# Patient Record
Sex: FEMALE | Race: WHITE
[De-identification: names, ages, dates, MRNs, and addresses within clinical notes are randomized per-mention and may not be internally consistent; named-entity substitution may affect disease eponyms.]

---

## 2018-09-27 ENCOUNTER — HOSPITAL ENCOUNTER (EMERGENCY)
Dept: HOSPITAL 61 - ER | Age: 38
Discharge: HOME | End: 2018-09-27
Payer: COMMERCIAL

## 2018-09-27 VITALS — BODY MASS INDEX: 20.14 KG/M2 | HEIGHT: 64 IN | WEIGHT: 118 LBS

## 2018-09-27 VITALS — SYSTOLIC BLOOD PRESSURE: 110 MMHG | DIASTOLIC BLOOD PRESSURE: 60 MMHG

## 2018-09-27 DIAGNOSIS — R55: Primary | ICD-10-CM

## 2018-09-27 DIAGNOSIS — G43.909: ICD-10-CM

## 2018-09-27 DIAGNOSIS — R00.0: ICD-10-CM

## 2018-09-27 DIAGNOSIS — R42: ICD-10-CM

## 2018-09-27 LAB
ALBUMIN SERPL-MCNC: 3.7 G/DL (ref 3.4–5)
ALBUMIN/GLOB SERPL: 0.9 {RATIO} (ref 1–1.7)
ALP SERPL-CCNC: 47 U/L (ref 46–116)
ALT SERPL-CCNC: 19 U/L (ref 14–59)
ANION GAP SERPL CALC-SCNC: 12 MMOL/L (ref 6–14)
APTT PPP: YELLOW S
AST SERPL-CCNC: 15 U/L (ref 15–37)
BACTERIA #/AREA URNS HPF: (no result) /HPF
BASOPHILS # BLD AUTO: 0 X10^3/UL (ref 0–0.2)
BASOPHILS NFR BLD: 1 % (ref 0–3)
BILIRUB SERPL-MCNC: 0.4 MG/DL (ref 0.2–1)
BILIRUB UR QL STRIP: NEGATIVE
BUN SERPL-MCNC: 11 MG/DL (ref 7–20)
BUN/CREAT SERPL: 14 (ref 6–20)
CALCIUM SERPL-MCNC: 9.4 MG/DL (ref 8.5–10.1)
CHLORIDE SERPL-SCNC: 106 MMOL/L (ref 98–107)
CO2 SERPL-SCNC: 25 MMOL/L (ref 21–32)
CREAT SERPL-MCNC: 0.8 MG/DL (ref 0.6–1)
EOSINOPHIL NFR BLD: 0 X10^3/UL (ref 0–0.7)
EOSINOPHIL NFR BLD: 1 % (ref 0–3)
ERYTHROCYTE [DISTWIDTH] IN BLOOD BY AUTOMATED COUNT: 12.4 % (ref 11.5–14.5)
FIBRINOGEN PPP-MCNC: CLEAR MG/DL
GFR SERPLBLD BASED ON 1.73 SQ M-ARVRAT: 81.2 ML/MIN
GLOBULIN SER-MCNC: 4 G/DL (ref 2.2–3.8)
GLUCOSE SERPL-MCNC: 114 MG/DL (ref 70–99)
HCT VFR BLD CALC: 35.2 % (ref 36–47)
HGB BLD-MCNC: 12.3 G/DL (ref 12–15.5)
LIPASE: 318 U/L (ref 73–393)
LYMPHOCYTES # BLD: 1.7 X10^3/UL (ref 1–4.8)
LYMPHOCYTES NFR BLD AUTO: 25 % (ref 24–48)
MCH RBC QN AUTO: 32 PG (ref 25–35)
MCHC RBC AUTO-ENTMCNC: 35 G/DL (ref 31–37)
MCV RBC AUTO: 92 FL (ref 79–100)
MONO #: 0.4 X10^3/UL (ref 0–1.1)
MONOCYTES NFR BLD: 7 % (ref 0–9)
NEUT #: 4.5 X10^3UL (ref 1.8–7.7)
NEUTROPHILS NFR BLD AUTO: 67 % (ref 31–73)
NITRITE UR QL STRIP: NEGATIVE
PH UR STRIP: 5.5 [PH]
PLATELET # BLD AUTO: 269 X10^3/UL (ref 140–400)
POTASSIUM SERPL-SCNC: 3.6 MMOL/L (ref 3.5–5.1)
PROT SERPL-MCNC: 7.7 G/DL (ref 6.4–8.2)
PROT UR STRIP-MCNC: NEGATIVE MG/DL
RBC # BLD AUTO: 3.83 X10^6/UL (ref 3.5–5.4)
RBC #/AREA URNS HPF: (no result) /HPF (ref 0–2)
SODIUM SERPL-SCNC: 143 MMOL/L (ref 136–145)
SQUAMOUS #/AREA URNS LPF: (no result) /LPF
T4 FREE SERPL-MCNC: 1.08 NG/DL (ref 0.76–1.46)
THYROID STIM HORMONE (TSH): 1.95 UIU/ML (ref 0.36–3.74)
UROBILINOGEN UR-MCNC: 0.2 MG/DL
WBC # BLD AUTO: 6.8 X10^3/UL (ref 4–11)
WBC #/AREA URNS HPF: (no result) /HPF (ref 0–4)

## 2018-09-27 PROCEDURE — 87086 URINE CULTURE/COLONY COUNT: CPT

## 2018-09-27 PROCEDURE — 93970 EXTREMITY STUDY: CPT

## 2018-09-27 PROCEDURE — 81025 URINE PREGNANCY TEST: CPT

## 2018-09-27 PROCEDURE — 81001 URINALYSIS AUTO W/SCOPE: CPT

## 2018-09-27 PROCEDURE — 82962 GLUCOSE BLOOD TEST: CPT

## 2018-09-27 PROCEDURE — 99285 EMERGENCY DEPT VISIT HI MDM: CPT

## 2018-09-27 PROCEDURE — 84443 ASSAY THYROID STIM HORMONE: CPT

## 2018-09-27 PROCEDURE — 84439 ASSAY OF FREE THYROXINE: CPT

## 2018-09-27 PROCEDURE — 83690 ASSAY OF LIPASE: CPT

## 2018-09-27 PROCEDURE — 84484 ASSAY OF TROPONIN QUANT: CPT

## 2018-09-27 PROCEDURE — 80053 COMPREHEN METABOLIC PANEL: CPT

## 2018-09-27 PROCEDURE — 93005 ELECTROCARDIOGRAM TRACING: CPT

## 2018-09-27 PROCEDURE — 36415 COLL VENOUS BLD VENIPUNCTURE: CPT

## 2018-09-27 PROCEDURE — 85025 COMPLETE CBC W/AUTO DIFF WBC: CPT

## 2018-09-27 PROCEDURE — 96361 HYDRATE IV INFUSION ADD-ON: CPT

## 2018-09-27 PROCEDURE — 96374 THER/PROPH/DIAG INJ IV PUSH: CPT

## 2018-09-27 NOTE — PHYS DOC
Past Medical History


Past Medical History:  Migraines, Other


Additional Past Medical Histor:  SVT


Past Surgical History:  Other


Additional Past Surgical Histo:  LEEP,EYE


Additional Information:  


VAPES


Alcohol Use:  None


Drug Use:  None





Adult General


Chief Complaint


Chief Complaint:  NEAR SYNCOPE





[facility]


HPI





Patient is a 38  year old female who presents to the ER after near syncopal 

episode. Patient reports history of SVT previously follows with Dr. Brownlee at 

[place] [facility]. Patient reports that she was recommended for 

ablation but managed to symptoms with metoprolol and has since tapered off. 

Currently not on any medications Patient reports that she last had an episode 

of SVT approximately 3 years ago. Patient reports on Thursday she had syncopal 

episode after getting up off of the couch. Patient states that she was 

evaluated at an ER was told that she likely had vasovagal episode. Patient 

reports that she has since had similar episodes on a daily basis. Patient 

reports today she was at her desk at work when she had acute onset of nausea, 

lightheadedness and felt that she was given a pass out. Symptoms are currently 

resolved. She denies any preceding chest pain, shortness of breath, 

palpitations. Patient reports intermittent calf pain to bilateral calves. On 

EMS evaluation patient had heart rate in the 120s but was noted to be normal 

sinus rhythm. Patient admits to feeling anxious at this time but denies any 

increased social stressors.





Review of Systems


Review of Systems





Constitutional: Denies fever or chills []


Eyes: Denies change in visual acuity, redness, or eye pain []


HENT: Denies nasal congestion or sore throat []


Respiratory: Denies cough or shortness of breath []


Cardiovascular: No additional information not addressed in HPI []


GI: Denies abdominal pain, nausea, vomiting, bloody stools or diarrhea []


: Denies dysuria or hematuria []


Musculoskeletal: Denies back pain or joint pain []


Integument: Denies rash or skin lesions []


Neurologic: Denies headache, focal weakness or sensory changes []


Endocrine: Denies polyuria or polydipsia []





All other systems were reviewed and found to be within normal limits, except as 

documented in this note.





Current Medications


Current Medications





Current Medications








 Medications


  (Trade)  Dose


 Ordered  Sig/April  Start Time


 Stop Time Status Last Admin


Dose Admin


 


 Ondansetron HCl


  (Zofran)  8 mg  1X  ONCE  9/27/18 14:30


 9/27/18 14:40 DC 9/27/18 14:46


8 MG


 


 Sodium Chloride  1,000 ml @ 


 1,000 mls/hr  1X  ONCE  9/27/18 14:30


 9/27/18 15:29  9/27/18 14:45


1,000 MLS/HR











Allergies


Allergies





Allergies








Coded Allergies Type Severity Reaction Last Updated Verified


 


  No Known Drug Allergies    9/27/18 No











Physical Exam


Physical Exam





Constitutional: Well developed, well nourished, nontoxic appearing, moderately 

anxious


HENT: Normocephalic, atraumatic, 


Eyes: PERRLA, EOMI, conjunctiva normal, no discharge. [] 


Neck: Normal range of motion, no tenderness, supple, no stridor. [] 


Cardiovascular tachycardic,, no murmur []


Lungs & Thorax:  Bilateral breath sounds clear to auscultation []


Abdomen: Bowel sounds normal, soft, no tenderness, no masses, no pulsatile 

masses. [] 


Skin: Warm, dry, no erythema, no rash. [] 


Back: No tenderness, no CVA tenderness. [] 


Extremities: No tenderness,no edema. [] 


Neurologic: Alert and oriented X 3,  no focal deficits noted. []


Psychologic: Affect normal, judgement normal, mood normal. []





Current Patient Data


Vital Signs





 Vital Signs








  Date Time  Temp Pulse Resp B/P (MAP) Pulse Ox O2 Delivery O2 Flow Rate FiO2


 


9/27/18 14:42  86   100   


 


9/27/18 13:14 98.8  20 142/83 (102)  Room Air  





 98.8       








Lab Values





 Laboratory Tests








Test


 9/27/18


13:28 9/27/18


13:45 9/27/18


13:56 9/27/18


14:04


 


Glucose (Fingerstick)


 125 mg/dL


(70-99)  H 


 


 





 


White Blood Count


 


 6.8 x10^3/uL


(4.0-11.0) 


 





 


Red Blood Count


 


 3.83 x10^6/uL


(3.50-5.40) 


 





 


Hemoglobin


 


 12.3 g/dL


(12.0-15.5) 


 





 


Hematocrit


 


 35.2 %


(36.0-47.0)  L 


 





 


Mean Corpuscular Volume


 


 92 fL ()


 


 





 


Mean Corpuscular Hemoglobin  32 pg (25-35)    


 


Mean Corpuscular Hemoglobin


Concent 


 35 g/dL


(31-37) 


 





 


Red Cell Distribution Width


 


 12.4 %


(11.5-14.5) 


 





 


Platelet Count


 


 269 x10^3/uL


(140-400) 


 





 


Neutrophils (%) (Auto)  67 % (31-73)    


 


Lymphocytes (%) (Auto)  25 % (24-48)    


 


Monocytes (%) (Auto)  7 % (0-9)    


 


Eosinophils (%) (Auto)  1 % (0-3)    


 


Basophils (%) (Auto)  1 % (0-3)    


 


Neutrophils # (Auto)


 


 4.5 x10^3uL


(1.8-7.7) 


 





 


Lymphocytes # (Auto)


 


 1.7 x10^3/uL


(1.0-4.8) 


 





 


Monocytes # (Auto)


 


 0.4 x10^3/uL


(0.0-1.1) 


 





 


Eosinophils # (Auto)


 


 0.0 x10^3/uL


(0.0-0.7) 


 





 


Basophils # (Auto)


 


 0.0 x10^3/uL


(0.0-0.2) 


 





 


Sodium Level


 


 143 mmol/L


(136-145) 


 





 


Potassium Level


 


 3.6 mmol/L


(3.5-5.1) 


 





 


Chloride Level


 


 106 mmol/L


() 


 





 


Carbon Dioxide Level


 


 25 mmol/L


(21-32) 


 





 


Anion Gap  12 (6-14)    


 


Blood Urea Nitrogen


 


 11 mg/dL


(7-20) 


 





 


Creatinine


 


 0.8 mg/dL


(0.6-1.0) 


 





 


Estimated GFR


(Cockcroft-Gault) 


 81.2  


 


 





 


BUN/Creatinine Ratio  14 (6-20)    


 


Glucose Level


 


 114 mg/dL


(70-99)  H 


 





 


Calcium Level


 


 9.4 mg/dL


(8.5-10.1) 


 





 


Total Bilirubin


 


 0.4 mg/dL


(0.2-1.0) 


 





 


Aspartate Amino Transferase


(AST) 


 15 U/L (15-37)


 


 





 


Alanine Aminotransferase (ALT)


 


 19 U/L (14-59)


 


 





 


Alkaline Phosphatase


 


 47 U/L


() 


 





 


Troponin I Quantitative


 


 < 0.017 ng/mL


(0.000-0.055) 


 





 


Total Protein


 


 7.7 g/dL


(6.4-8.2) 


 





 


Albumin


 


 3.7 g/dL


(3.4-5.0) 


 





 


Albumin/Globulin Ratio


 


 0.9 (1.0-1.7)


L 


 





 


Lipase


 


 318 U/L


() 


 





 


Thyroid Stimulating Hormone


(TSH) 


 1.946 uIU/mL


(0.358-3.74) 


 





 


Free Thyroxine


 


 1.08 ng/dL


(0.76-1.46) 


 





 


Urine Collection Type   Void   


 


Urine Color   Yellow   


 


Urine Clarity   Clear   


 


Urine pH   5.5   


 


Urine Specific Gravity   1.010   


 


Urine Protein


 


 


 Negative mg/dL


(NEG-TRACE) 





 


Urine Glucose (UA)


 


 


 Negative mg/dL


(NEG) 





 


Urine Ketones (Stick)


 


 


 Negative mg/dL


(NEG) 





 


Urine Blood   Trace (NEG)   


 


Urine Nitrite


 


 


 Negative (NEG)


 





 


Urine Bilirubin


 


 


 Negative (NEG)


 





 


Urine Urobilinogen Dipstick


 


 


 0.2 mg/dL (0.2


mg/dL) 





 


Urine Leukocyte Esterase


 


 


 Negative (NEG)


 





 


Urine RBC


 


 


 Rare /HPF


(0-2) 





 


Urine WBC


 


 


 1-4 /HPF (0-4)


 





 


Urine Squamous Epithelial


Cells 


 


 Mod /LPF  


 





 


Urine Bacteria


 


 


 Moderate /HPF


(0-FEW) 





 


POC Urine HCG, Qualitative


 


 


 


 Hcg negative


(Negative)





 Laboratory Tests


9/27/18 13:45








 Laboratory Tests


9/27/18 13:45














EKG


EKG


Normal sinus rhythm, heart rate 99, no significant ST segment changes.[]





Radiology/Procedures


Radiology/Procedures


Doppler venous bilateral 





Impression: Negative for deep venous thrombosis


 


Electronically signed by: Adrien Lopez MD (9/27/2018 2:19 PM) [id number]


[]





Course & Med Decision Making


Course & Med Decision Making


Pertinent Labs and Imaging studies reviewed. (See chart for details)





[]Patient feeling better. Patient had brief episode of sinus tachycardia with 

heart rate up to 1 teens while friends are present questioning her. On my 

evaluation during this episode patient was acutely anxious and agitated similar 

to her presentation at arrival. Patient was easily calmed after 1-2 minutes of 

calming discussion. Heart rate now in the 80s. Reassuring labs including TSH/

T4. Patient does have history of SVT but has not had any episodes while in the 

ER. Advised close PCP and cardiology follow-up. Bilateral LE doppler normal. 

Patient states that she is already established with cardiology at [facility]. ER return precautions given. Patient verbalized understanding. 

All questions answered.





Dragon Disclaimer


Dragon Disclaimer


This electronic medical record was generated, in whole or in part, using a 

voice recognition dictation system.





Departure


Departure


Impression:  


 Primary Impression:  


 Near syncope


 Additional Impression:  


 Sinus tachycardia


Disposition:  01 HOME, SELF-CARE


Condition:  IMPROVED


Referrals:  


NO PCP (PCP)


Patient Instructions:  Syncope





Additional Instructions:  


Thank you for coming to [facility]. Please repeat the attached 

handouts. Please follow-up with your primary care physician. Return to the ER  

if your symptoms worsen or you have any other concerns.


Follow-up with your cardiologist.





Problem Qualifiers











WYATT CARBAJAL DO Sep 27, 2018 15:18

## 2018-09-27 NOTE — RAD
Bilateral lower extremity venous doppler ultrasound

 

Indication:b/l leg pain .

 

Technique: Color Doppler, grayscale, and spectral waveform analysis is 

used to evaluate the right and left lower extremity deep venous system, 

including the common femoral vein, superficial femoral vein, popliteal 

vein, and visualized calf veins.

 

Right leg:

No evidence of deep venous thrombosis. Normal response to augmentation, 

normal compressibility and normal phasicity is demonstrated. Visualized 

calf veins are patent. 

 

Left leg:

No evidence of deep venous thrombosis. Normal response to augmentation, 

normal compressibility and normal phasicity is demonstrated. Visualized 

calf veins are patent. 

 

Impression: Negative for deep venous thrombosis

 

Electronically signed by: Adrien Lopez MD (9/27/2018 2:19 PM) [facility]

## 2018-09-27 NOTE — EKG
[facility]

              [address] [place] [address]

Test Date:    2018               Test Time:    13:19:07

Pat Name:     TIKI ELLIOTT             Department:   

Patient ID:   [id number]           Room:          

Gender:       F                        Technician:   

:          1980               Requested By: WYATT CARBAJAL

Order Number: [id number]           Reading MD:   Ananda Skinner

                                 Measurements

Intervals                              Axis          

Rate:         98                       P:            37

OH:           140                      QRS:          66

QRSD:         74                       T:            59

QT:           324                                    

QTc:          420                                    

                           Interpretive Statements

SINUS RHYTHM

NORMAL ECG



Electronically Signed On 10-1-2018 11:57:50 CDT by Ananda Skinner